# Patient Record
Sex: MALE | Race: WHITE | NOT HISPANIC OR LATINO | Employment: OTHER | URBAN - METROPOLITAN AREA
[De-identification: names, ages, dates, MRNs, and addresses within clinical notes are randomized per-mention and may not be internally consistent; named-entity substitution may affect disease eponyms.]

---

## 2022-02-25 NOTE — PATIENT DISCUSSION
I have discussed with the patient the option of blepharoplasty surgery to improve the functional visual field.

## 2022-02-25 NOTE — PATIENT DISCUSSION
The visual field improves by lifting this tissue which is supported by evidence of taped visual fields.

## 2022-08-22 ENCOUNTER — EMERGENCY VISIT (OUTPATIENT)
Dept: URBAN - METROPOLITAN AREA CLINIC 40 | Facility: CLINIC | Age: 77
End: 2022-08-22

## 2022-08-22 DIAGNOSIS — S00.12XA: ICD-10-CM

## 2022-08-22 DIAGNOSIS — S05.12XA: ICD-10-CM

## 2022-08-22 DIAGNOSIS — H11.32: ICD-10-CM

## 2022-08-22 DIAGNOSIS — H02.824: ICD-10-CM

## 2022-08-22 DIAGNOSIS — H43.813: ICD-10-CM

## 2022-08-22 PROCEDURE — 92004 COMPRE OPH EXAM NEW PT 1/>: CPT

## 2022-09-02 ASSESSMENT — VISUAL ACUITY
OD_SC: 20/20
OS_SC: 20/20

## 2022-09-02 ASSESSMENT — TONOMETRY
OD_IOP_MMHG: 16
OS_IOP_MMHG: 17

## 2022-09-19 ENCOUNTER — ESTABLISHED PATIENT (OUTPATIENT)
Dept: URBAN - METROPOLITAN AREA CLINIC 39 | Facility: CLINIC | Age: 77
End: 2022-09-19

## 2022-09-19 DIAGNOSIS — D23.121: ICD-10-CM

## 2022-09-19 PROCEDURE — 92285 EXTERNAL OCULAR PHOTOGRAPHY: CPT

## 2022-09-19 PROCEDURE — 67840 REMOVE EYELID LESION: CPT

## 2022-09-19 RX ORDER — ERYTHROMYCIN 5 MG/G
OINTMENT OPHTHALMIC TWICE A DAY
End: 2022-10-03

## 2022-09-19 ASSESSMENT — VISUAL ACUITY
OS_SC: 20/20
OD_SC: 20/20

## 2022-11-14 ENCOUNTER — EMERGENCY VISIT (OUTPATIENT)
Dept: URBAN - METROPOLITAN AREA CLINIC 40 | Facility: CLINIC | Age: 77
End: 2022-11-14

## 2022-11-14 DIAGNOSIS — H43.813: ICD-10-CM

## 2022-11-14 DIAGNOSIS — H02.824: ICD-10-CM

## 2022-11-14 DIAGNOSIS — H02.402: ICD-10-CM

## 2022-11-14 PROCEDURE — 92014 COMPRE OPH EXAM EST PT 1/>: CPT

## 2022-11-14 ASSESSMENT — TONOMETRY
OS_IOP_MMHG: 16
OD_IOP_MMHG: 16

## 2022-11-14 ASSESSMENT — VISUAL ACUITY
OS_SC: 20/20
OD_SC: 20/20-2